# Patient Record
Sex: FEMALE | URBAN - METROPOLITAN AREA
[De-identification: names, ages, dates, MRNs, and addresses within clinical notes are randomized per-mention and may not be internally consistent; named-entity substitution may affect disease eponyms.]

---

## 2017-08-28 ENCOUNTER — HOSPITAL ENCOUNTER (OUTPATIENT)
Dept: SLEEP MEDICINE | Age: 57
Discharge: HOME | End: 2017-08-28
Payer: MEDICARE

## 2017-08-28 DIAGNOSIS — I10 HYPERTENSION: ICD-10-CM

## 2017-08-28 DIAGNOSIS — E66.9 OBESITY: ICD-10-CM

## 2017-08-28 DIAGNOSIS — G47.33 OSA (OBSTRUCTIVE SLEEP APNEA): ICD-10-CM

## 2017-08-28 DIAGNOSIS — K21.9 GERD (GASTROESOPHAGEAL REFLUX DISEASE): ICD-10-CM

## 2017-08-28 PROCEDURE — 95811 POLYSOM 6/>YRS CPAP 4/> PARM: CPT

## 2017-08-29 VITALS — HEART RATE: 114 BPM | DIASTOLIC BLOOD PRESSURE: 103 MMHG | SYSTOLIC BLOOD PRESSURE: 154 MMHG

## 2017-08-29 NOTE — PROGRESS NOTES
Pt was in the triage room BP was high (154/103 hr 114) on both arms but pt was asymptomatic however, upon wiring the patient stated that she felt dizzy and her legs were swollen. Tech then proceeded to contact the nursing supervisor George Duarte and Pauline Lyle who arrived to speak with the patient. The patient then stated that she was having anxiety due to her brother dying in the hospital and refused to be evaluated by the emergency room. The nursing supervisors felt that the patient could continue with the study. Tech completed wiring the patient with the understanding that if her symptoms worsened she was to alert the tech.   Pt stated she may not do that because she did not want to be admitted in this hospital.